# Patient Record
Sex: FEMALE | Race: BLACK OR AFRICAN AMERICAN | NOT HISPANIC OR LATINO | Employment: FULL TIME | URBAN - METROPOLITAN AREA
[De-identification: names, ages, dates, MRNs, and addresses within clinical notes are randomized per-mention and may not be internally consistent; named-entity substitution may affect disease eponyms.]

---

## 2018-04-25 LAB — HIV-1 AND HIV-2 ANTIBODIES: NEGATIVE

## 2020-10-15 PROBLEM — I15.9 SECONDARY HYPERTENSION: Status: ACTIVE | Noted: 2020-10-15

## 2020-10-15 PROBLEM — E87.6 HYPOKALEMIA: Status: ACTIVE | Noted: 2020-10-15

## 2020-10-15 PROBLEM — I10 ESSENTIAL HYPERTENSION: Status: ACTIVE | Noted: 2020-10-15

## 2020-10-15 PROBLEM — F32.A DEPRESSION: Status: ACTIVE | Noted: 2020-10-15

## 2021-04-28 DIAGNOSIS — Z12.31 OTHER SCREENING MAMMOGRAM: ICD-10-CM

## 2021-11-01 PROBLEM — N92.1 MENORRHAGIA WITH IRREGULAR CYCLE: Status: ACTIVE | Noted: 2021-11-01

## 2021-11-09 ENCOUNTER — PATIENT OUTREACH (OUTPATIENT)
Dept: ADMINISTRATIVE | Facility: HOSPITAL | Age: 41
End: 2021-11-09

## 2021-11-09 PROBLEM — R87.610 ASCUS OF CERVIX WITH NEGATIVE HIGH RISK HPV: Status: ACTIVE | Noted: 2021-11-09

## 2022-02-10 PROBLEM — M54.6 CHRONIC LEFT-SIDED THORACIC BACK PAIN: Status: ACTIVE | Noted: 2022-02-10

## 2022-02-10 PROBLEM — G89.29 CHRONIC LEFT-SIDED THORACIC BACK PAIN: Status: ACTIVE | Noted: 2022-02-10

## 2022-02-24 PROBLEM — Z30.431 IUD CHECK UP: Status: ACTIVE | Noted: 2022-02-24

## 2023-09-13 ENCOUNTER — TELEPHONE (OUTPATIENT)
Dept: OTOLARYNGOLOGY | Facility: CLINIC | Age: 43
End: 2023-09-13

## 2023-09-13 NOTE — TELEPHONE ENCOUNTER
----- Message from Giovanni Frank sent at 9/13/2023 11:26 AM CDT -----  Contact: johnnie Bro is calling in regards to getting order ct scan of face, jaw and sinus without contact for patient. Please fax it over to [phone] and call back number is [phone] option 2 option 3. Thanks KB

## 2024-05-13 PROBLEM — R09.81 SINUS CONGESTION: Status: ACTIVE | Noted: 2024-05-13

## 2024-05-28 PROBLEM — R07.89 CHEST PRESSURE: Status: ACTIVE | Noted: 2024-05-28

## 2024-05-28 PROBLEM — R51.9 FRONTAL HEADACHE: Status: ACTIVE | Noted: 2024-05-28

## 2024-05-28 PROBLEM — R20.2 TINGLING OF BOTH UPPER EXTREMITIES: Status: ACTIVE | Noted: 2024-05-28

## 2024-06-05 PROBLEM — F41.9 ANXIETY: Status: ACTIVE | Noted: 2024-06-05

## 2024-06-05 PROBLEM — R73.03 PREDIABETES: Status: ACTIVE | Noted: 2024-06-05

## 2024-06-05 PROBLEM — F41.0 PANIC DISORDER: Status: ACTIVE | Noted: 2024-06-05

## 2024-08-05 PROBLEM — G47.00 INSOMNIA: Status: ACTIVE | Noted: 2024-08-05

## 2024-11-22 ENCOUNTER — PATIENT OUTREACH (OUTPATIENT)
Dept: ADMINISTRATIVE | Facility: HOSPITAL | Age: 44
End: 2024-11-22

## 2024-11-22 DIAGNOSIS — Z12.11 SPECIAL SCREENING FOR MALIGNANT NEOPLASMS, COLON: Primary | ICD-10-CM
